# Patient Record
Sex: MALE | Race: WHITE | Employment: FULL TIME | URBAN - METROPOLITAN AREA
[De-identification: names, ages, dates, MRNs, and addresses within clinical notes are randomized per-mention and may not be internally consistent; named-entity substitution may affect disease eponyms.]

---

## 2019-08-22 ENCOUNTER — OFFICE VISIT (OUTPATIENT)
Dept: PHYSICAL MEDICINE AND REHAB | Age: 44
End: 2019-08-22

## 2019-08-22 DIAGNOSIS — G57.13 MERALGIA PARESTHETICA OF BOTH LOWER EXTREMITIES: Primary | ICD-10-CM

## 2019-08-22 DIAGNOSIS — M79.601 PARESTHESIA AND PAIN OF BOTH UPPER EXTREMITIES: ICD-10-CM

## 2019-08-22 DIAGNOSIS — R20.2 PARESTHESIA AND PAIN OF BOTH UPPER EXTREMITIES: ICD-10-CM

## 2019-08-22 DIAGNOSIS — G57.32 PERONEAL NEUROPATHY, LEFT: ICD-10-CM

## 2019-08-22 DIAGNOSIS — M79.602 PARESTHESIA AND PAIN OF BOTH UPPER EXTREMITIES: ICD-10-CM

## 2019-08-22 PROCEDURE — 95911 NRV CNDJ TEST 9-10 STUDIES: CPT | Performed by: PHYSICAL MEDICINE & REHABILITATION

## 2019-08-22 PROCEDURE — 95885 MUSC TST DONE W/NERV TST LIM: CPT | Performed by: PHYSICAL MEDICINE & REHABILITATION

## 2019-08-22 NOTE — LETTER
August 22, 2019        Julianne Cm PA-C  [address],[facility], [address],Third [address]        Patient Name: Ryan Albarran   MRN Number: [id number]   YOB: 1975   Date Of Visit: 08/22/2019        Dear Julianne Cm PA-C,       Thank you for referring Ryan Albarran to me for electrodiagnostic testing. Attached are the findings of the EMG and my assessment. If you have any further questions, please do not hesitate to call me. Thank you for this interesting referral.      Sincerely,          NITIN Landeros MD.

## 2019-08-22 NOTE — PROGRESS NOTES
paraspinals    Normal None Normal   Glut Med    Normal None Normal   Rect fem    Normal None Normal   Vast Med    Normal None Normal   Ant Tibialis    Normal None Normal   EHL    Normal None Normal   FDL    Normal None Normal   Gastroc    Normal None Normal   EDB    Normal None Normal   1st dors int    Normal None Normal       FINDINGS:   EMG of the lumbar paraspinals and left lower limb demonstrated no paraspinal nor limb muscle membrane irritability. Motor units were of normal configuration and recruitment except in the left EDB muscle were motor units were larger and diminished in number. The lateral femoral cutaneous sensory responses could not be identified in either leg. There was some compromise of the left peroneal motor amplitude with conduction around the left fibular neck. All other sensory and motor latencies, evoked amplitudes and conduction velocities were normal.  Tibial H-reflexes were reasonably symmetric. IMPRESSION:      1. Mildly abnormal EMG. 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 It seems subacute on the right. 2.  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 This is likely associated with his remote lower limb trauma. It would seem unrelated to his presenting symptoms. 3.  No evidence of a focal spinal nerve root injury (radiculopathy), plexopathy, generalized neuropathy or primary muscle disease.           Thank you for this interesting referral.

## 2021-05-05 ENCOUNTER — HOSPITAL ENCOUNTER (OUTPATIENT)
Dept: ULTRASOUND IMAGING | Age: 46
Discharge: HOME | End: 2021-05-05
Payer: COMMERCIAL

## 2021-05-05 DIAGNOSIS — R39.9 UNSPECIFIED SYMPTOMS AND SIGNS INVOLVING THE GENITOURINARY SYSTEM: ICD-10-CM

## 2021-05-05 PROCEDURE — 76857 US EXAM PELVIC LIMITED: CPT
